# Patient Record
Sex: MALE | Race: WHITE | NOT HISPANIC OR LATINO | Employment: PART TIME | URBAN - METROPOLITAN AREA
[De-identification: names, ages, dates, MRNs, and addresses within clinical notes are randomized per-mention and may not be internally consistent; named-entity substitution may affect disease eponyms.]

---

## 2017-04-11 ENCOUNTER — OFFICE VISIT (OUTPATIENT)
Dept: FAMILY MEDICINE CLINIC | Facility: CLINIC | Age: 47
End: 2017-04-11

## 2017-04-11 VITALS
DIASTOLIC BLOOD PRESSURE: 80 MMHG | BODY MASS INDEX: 33.07 KG/M2 | HEIGHT: 70 IN | HEART RATE: 72 BPM | SYSTOLIC BLOOD PRESSURE: 120 MMHG | OXYGEN SATURATION: 100 % | WEIGHT: 231 LBS

## 2017-04-11 DIAGNOSIS — M25.512 ACUTE PAIN OF LEFT SHOULDER: Primary | ICD-10-CM

## 2017-04-11 PROCEDURE — 99213 OFFICE O/P EST LOW 20 MIN: CPT | Performed by: FAMILY MEDICINE

## 2017-04-11 NOTE — PROGRESS NOTES
Subjective   Leif Mcmahon is a 46 y.o. male here to discuss left shoulder pain.    History of Present Illness   He has had pain for about a month. He has been lifting weights and thinks that he may have injured it. He has not tried any OTC medicine. He has been to [facility] in the past and would like to see [facility].  He is very reluctant to take any OTC meds.    The following portions of the patient's history were reviewed and updated as appropriate: allergies, current medications, past medical history, past social history and problem list.    Review of Systems   All other systems reviewed and are negative.      Objective   Physical Exam   Constitutional: He appears well-developed.   Cardiovascular: Normal rate and regular rhythm.    Pulmonary/Chest: Effort normal.   Musculoskeletal: Normal range of motion. He exhibits no tenderness or deformity.   Neurological: He is alert.   Nursing note and vitals reviewed.      Assessment/Plan   Leif was seen today for shoulder pain.    Diagnoses and all orders for this visit:    Acute pain of left shoulder  -     Ambulatory Referral to Physical Therapy Evaluate and treat    Referred to PT and advised OTC meds for discomfort.

## 2017-06-23 ENCOUNTER — OFFICE VISIT (OUTPATIENT)
Dept: FAMILY MEDICINE CLINIC | Facility: CLINIC | Age: 47
End: 2017-06-23

## 2017-06-23 VITALS
BODY MASS INDEX: 32.35 KG/M2 | RESPIRATION RATE: 16 BRPM | SYSTOLIC BLOOD PRESSURE: 104 MMHG | HEART RATE: 80 BPM | WEIGHT: 226 LBS | HEIGHT: 70 IN | DIASTOLIC BLOOD PRESSURE: 70 MMHG | OXYGEN SATURATION: 99 %

## 2017-06-23 DIAGNOSIS — L23.7 CONTACT DERMATITIS DUE TO POISON IVY: Primary | ICD-10-CM

## 2017-06-23 PROCEDURE — 99213 OFFICE O/P EST LOW 20 MIN: CPT | Performed by: FAMILY MEDICINE

## 2017-06-23 RX ORDER — PREDNISONE 10 MG/1
TABLET ORAL
Qty: 50 TABLET | Refills: 0 | Status: SHIPPED | OUTPATIENT
Start: 2017-06-23

## 2017-06-23 RX ORDER — TRIAMCINOLONE ACETONIDE 1 MG/G
CREAM TOPICAL 2 TIMES DAILY
Qty: 45 G | Refills: 1 | Status: SHIPPED | OUTPATIENT
Start: 2017-06-23

## 2017-06-23 NOTE — PATIENT INSTRUCTIONS
Triamcinolone cream three times a day, and try calamine lotion 2 or three times a day.  Take steroids as needed. Take with food, never on an empty stomach.  To treat the rash you have from poison ivy, poison oak or sumac:    I have given you a prescription for a 2 week steroid taper. It is very important that you finish this medication as prescribed so you don't develop a rebound rash.   Take steroids with food and take Tums or Zantac daily to protect your stomach from the side effects of this medication.  You can use the prescribed steroid cream  2 or 3 times a day to help with rash and calamine lotion several times a day to dry the rash.    Keep yourself cool! Stay in air conditioning and use fans as much as possible.  Take the coolest showers you can stand because a hot shower will make you itchy.    Take over the counter Zyrtec during the day and Benadryl 25 or 50 mg at night to help with itching.    So - at the drugstore :  your prescriptions for prednisone pills and triamcinolone cream and purchase Calamine or Caladryl lotion, Tums or Zantac and Zyrtec and Benadryl.    Call me if redness gets worse or you develop a fever - signs of an infected rash.

## 2017-06-23 NOTE — PROGRESS NOTES
Subjective   Leif Mcmahon is a 46 y.o. male.     History of Present Illness   Sarkis is here w/ poison ivy.  He states he has had rash 5 days and is now open and weeping.  He has used Ivorest. He is concerned because the rash is now spreading to his legs and chest. He has no rash on his face.    The following portions of the patient's history were reviewed and updated as appropriate: allergies, current medications, past medical history and past social history.    Review of Systems   Skin: Positive for rash.   All other systems reviewed and are negative.      Objective   Physical Exam   Constitutional: He is oriented to person, place, and time. He appears well-developed and well-nourished.   HENT:   Head: Normocephalic and atraumatic.   Eyes: Conjunctivae and EOM are normal. Pupils are equal, round, and reactive to light.   Cardiovascular: Normal rate.    Pulmonary/Chest: Effort normal and breath sounds normal.   Musculoskeletal: Normal range of motion.   Neurological: He is alert and oriented to person, place, and time.   Skin: Skin is warm and dry. Rash noted.   Vesicular rash on left inner arm, with surrounding redness.  Patches of vesicles on legs and hands.   Psychiatric: He has a normal mood and affect. His behavior is normal. Judgment and thought content normal.   Nursing note and vitals reviewed.      Assessment/Plan   Leif was seen today for poison ivy.    Diagnoses and all orders for this visit:    Contact dermatitis due to poison ivy  -     triamcinolone (KENALOG) 0.1 % cream; Apply  topically 2 (Two) Times a Day.  -     predniSONE (DELTASONE) 10 MG tablet; 6 tabs po qd for 4 days, 4 tabs po qd for 4 days, 2 tabs po qd for 4 days, 1 tab po qd for 2 days    To treat the rash you have from poison ivy, poison oak or sumac:    I have given you a prescription for a 2 week steroid taper. It is very important that you finish this medication as prescribed so you don't develop a rebound rash.   Take steroids  with food and take Tums or Zantac daily to protect your stomach from the side effects of this medication.  You can use the prescribed steroid cream  2 or 3 times a day to help with rash and calamine lotion several times a day to dry the rash.    Keep yourself cool! Stay in air conditioning and use fans as much as possible.  Take the coolest showers you can stand because a hot shower will make you itchy.    Take over the counter Zyrtec during the day and Benadryl 25 or 50 mg at night to help with itching.    So - at the drugstore :  your prescriptions for prednisone pills and triamcinolone cream and purchase Calamine or Caladryl lotion, Tums or Zantac and Zyrtec and Benadryl.    Call me if redness gets worse or you develop a fever - signs of an infected rash.